# Patient Record
(demographics unavailable — no encounter records)

---

## 2024-11-05 NOTE — PHYSICAL EXAM
[de-identified] : Left knee  Constitutional:  The patient is healthy-appearing and in no apparent distress.  Patient is obsese.  Gait: The patient ambulates with a normal gait and no limp.  Cardiovascular System:  The capillary refill is less than 2 seconds.   Skin:  There are no skin abnormalities.  Left Knee:   Bony Palpation:  There is tenderness of the medial joint line.  There is no tenderness of the lateral joint line. There is tenderness of the medial femoral chondyle. There is tenderness of the lateral femoral chondyle. There is no tenderness of the tibial tubercle. There is no tenderness of the superior patella. There is no tenderness of the inferior patella. There is tenderness of the medial patellar facet. There is tenderness of the lateral patellar facet.  Soft Tissue Palpation:  There is no tenderness of the medial retinaculum. There is no tenderness of the lateral retinaculum. There is no tenderness of the quadriceps tendon. There is no tenderness of the patella tendon. There is no tenderness of the ITB. There is no tenderness of the pes anserine.  Active Range of Motion:  The range of motion at the knee actively and passively is full.   Special Tests:  There is a negative Apley. There is a negative Steinmanns.  There is a negative Lachman and Anterior Drawer. There is a negative Posterior Drawer.   There is no varus or valgus laxity.  Strength:  There is 5/5 hip flexion and 5/5 knee flexion and extension.    Psychiatric:  The patient demonstrates a normal mood and affect and is active and alert  [de-identified] : X-ray left knee:  There is mild to moderate medial and mild PF arthritis.  There is proximal medial calcification c/w prior MCL injury

## 2024-11-05 NOTE — PROCEDURE

## 2024-11-05 NOTE — HISTORY OF PRESENT ILLNESS
[de-identified] : Initial visit: left knee pain  Reason: no falls or injuries  Duration:  1 year and pain comes and goes  Prior studies:  Symptoms: throbbing / shooting pain Aggravating Fx: going up the steps and standing to long  Alleviating Fx: Pain level: 5/10 Pain medication: motrin/gabapentin  Medical Hx: no Surgical Hx: no Current Medication: Allergies: seafood/ codeine

## 2024-11-05 NOTE — HISTORY OF PRESENT ILLNESS
[de-identified] : Initial visit: left knee pain  Reason: no falls or injuries  Duration:  1 year and pain comes and goes  Prior studies:  Symptoms: throbbing / shooting pain Aggravating Fx: going up the steps and standing to long  Alleviating Fx: Pain level: 5/10 Pain medication: motrin/gabapentin  Medical Hx: no Surgical Hx: no Current Medication: Allergies: seafood/ codeine

## 2024-11-05 NOTE — PROCEDURE

## 2024-11-05 NOTE — PHYSICAL EXAM
[de-identified] : Left knee  Constitutional:  The patient is healthy-appearing and in no apparent distress.  Patient is obsese.  Gait: The patient ambulates with a normal gait and no limp.  Cardiovascular System:  The capillary refill is less than 2 seconds.   Skin:  There are no skin abnormalities.  Left Knee:   Bony Palpation:  There is tenderness of the medial joint line.  There is no tenderness of the lateral joint line. There is tenderness of the medial femoral chondyle. There is tenderness of the lateral femoral chondyle. There is no tenderness of the tibial tubercle. There is no tenderness of the superior patella. There is no tenderness of the inferior patella. There is tenderness of the medial patellar facet. There is tenderness of the lateral patellar facet.  Soft Tissue Palpation:  There is no tenderness of the medial retinaculum. There is no tenderness of the lateral retinaculum. There is no tenderness of the quadriceps tendon. There is no tenderness of the patella tendon. There is no tenderness of the ITB. There is no tenderness of the pes anserine.  Active Range of Motion:  The range of motion at the knee actively and passively is full.   Special Tests:  There is a negative Apley. There is a negative Steinmanns.  There is a negative Lachman and Anterior Drawer. There is a negative Posterior Drawer.   There is no varus or valgus laxity.  Strength:  There is 5/5 hip flexion and 5/5 knee flexion and extension.    Psychiatric:  The patient demonstrates a normal mood and affect and is active and alert  [de-identified] : X-ray left knee:  There is mild to moderate medial and mild PF arthritis.  There is proximal medial calcification c/w prior MCL injury

## 2024-11-05 NOTE — ASSESSMENT
[FreeTextEntry1] : Discussed at length with patient exam history and imaging as well as treatment options she elects home exercises only as well as cortisone injection discussed at length with patient underlying arthritis and that ultimately she may require total knee replacement for pain relief